# Patient Record
Sex: FEMALE
[De-identification: names, ages, dates, MRNs, and addresses within clinical notes are randomized per-mention and may not be internally consistent; named-entity substitution may affect disease eponyms.]

---

## 2023-04-22 ENCOUNTER — NURSE TRIAGE (OUTPATIENT)
Dept: OTHER | Facility: CLINIC | Age: 53
End: 2023-04-22

## 2023-04-22 NOTE — TELEPHONE ENCOUNTER
Location of patient: [facility]    Received call from Maxx Scales at [facility] with Red Flag Complaint. Trenton Barajas MRN: [id number]    Provider: Thuy Parmar NP     Current Symptoms: Lucretia is calling from the pt's facility, [facility]. Pt fell about 30 minutes ago. Pt states that she felt dizzy prior to falling. She is supposed to be wearing oxygen. She was not wearing it at the time of the fall. Lucretia is unable to check her vital signs at this time. Pt denies any injuries from the fall. While evaluating her, Lucretia noticed a burn on the pt's right palm. Pt states that she burned herself while doing witch craft. Lucretia believes that this happened sometime within the last 24 hours. She is concerned that the burn is infected. She has an appointment with her PCP on Monday 4/24/2023. Associated Symptoms: NA    Pain Severity: Denies pain    Temperature: Denies fever    What has been tried: Nothing yet    Recommended disposition: See PCP within 24 Hours    Care advice provided, patient verbalizes understanding; denies any other questions or concerns. Outcome: Lucretia will keep an eye on her today. She will call back if the patient develops new or worsening symptoms today. She plans on taking the pt to be evaluated tomorrow in the ED.      This triage is a result of a call to the [facility]    Reason for Disposition   [1] Looks infected (spreading redness, pus) AND [2] no fever    Protocols used: Burns - Thermal-ADULT-

## 2023-05-01 ENCOUNTER — NURSE TRIAGE (OUTPATIENT)
Dept: OTHER | Facility: CLINIC | Age: 53
End: 2023-05-01

## 2023-05-01 NOTE — TELEPHONE ENCOUNTER
Location of patient: [place]    Received call from Ryan at [facility] with Red Flag Complaint. Musa Best MRN: [id number]    Provider: Sobeida Figueroa MD    Limited triage as patient is not with caller. Subjective: Caller, Lucretia from [employer], states \"Last night she came down and asked for a large bandaid. She showed us per thumb and it had some deep cuts in it. They weren't bleeding at that point. I asked her how she got them. She said her bong had broken in her hand and she was minimizing the seriousness of the wound. There was a  present and myself and another coordinator. We tried to encourage her to call 911 to get it checked. She declined. The  thinks it may have been a bowl that exploded into her hand. I am concerned about infection. We are an apartment complex, not a healthcare center. She doesn't have transportation. \"     Current Symptoms: thumb injury (believed to be right)    Associated Symptoms:  lacerations/possible burn    Pain Severity: unable to assess    Temperature: unable to provide     What has been tried: bandaging    Recommended disposition: Go to ED Now    Care advice provided, patient verbalizes understanding; denies any other questions or concerns.     Outcome:     No Appointments available, patient referred to Emergency Department      [facility]    Reason for Disposition   Skin is split open or gaping (or length > 1/2 inch or 12 mm)    Protocols used: Finger Injury-ADULT-